# Patient Record
Sex: MALE | Race: WHITE
[De-identification: names, ages, dates, MRNs, and addresses within clinical notes are randomized per-mention and may not be internally consistent; named-entity substitution may affect disease eponyms.]

---

## 2018-01-24 ENCOUNTER — HOSPITAL ENCOUNTER (EMERGENCY)
Dept: HOSPITAL 25 - ED | Age: 28
Discharge: HOME | End: 2018-01-24
Payer: COMMERCIAL

## 2018-01-24 VITALS — SYSTOLIC BLOOD PRESSURE: 140 MMHG | DIASTOLIC BLOOD PRESSURE: 98 MMHG

## 2018-01-24 DIAGNOSIS — K04.7: Primary | ICD-10-CM

## 2018-01-24 DIAGNOSIS — F17.200: ICD-10-CM

## 2018-01-24 PROCEDURE — 99282 EMERGENCY DEPT VISIT SF MDM: CPT

## 2018-01-24 NOTE — ED
Throat Pain/Nasal Congestion





- HPI Summary


HPI Summary: 





27 male presents to ED with complaints of left bottom dental pain that began 2-

3 days ago and has been worsening since. Patient states he has been taking 

tylenol/ibuprofen without significant relief. Has also been applying temporary 

filler. Thinks his cavity caused trauma to tooth and is not possibly infected. 

Admits to some minimal swelling and swollen lymph nodes. States the pain 

radiates into left sinus and ear area. No fever/chills, nausea, vomiting, 

trouble swallowing or trouble breathing. No other complaints. No PMHx. No 

medications since 3pm when he took ibuprofen/tylenol. 





- History of Current Complaint


Chief Complaint: EDDentalPain


Time Seen by Provider: 01/24/18 01:20


Hx Obtained From: Patient


Onset/Duration: Sudden Onset, Lasting Days, Still Present


Severity: Moderate


Cough: Nonproductive





- Allergies/Home Medications


Allergies/Adverse Reactions: 


 Allergies











Allergy/AdvReac Type Severity Reaction Status Date / Time


 


No Known Allergies Allergy   Verified 01/24/18 00:37














PMH/Surg Hx/FS Hx/Imm Hx


Endocrine/Hematology History: 


   Denies: Hx Diabetes


Cardiovascular History: 


   Denies: Hx Hypertension


Respiratory History: 


   Denies: Hx Asthma





- Surgical History


Surgery Procedure, Year, and Place: n/a





- Immunization History


Immunizations Up to Date: Yes


Infectious Disease History: No


Infectious Disease History: 


   Denies: Traveled Outside the [place] in Last 30 Days





- Family History


Known Family History: Positive: None





- Social History


Alcohol Use: Occasionally


Substance Use Type: Reports: None


Smoking Status (MU): Heavy Every Day Tobacco Smoker





Review of Systems


Constitutional: Negative


Positive: Dental Pain, Ear Ache


Cardiovascular: Negative


Respiratory: Negative


Neurological: Negative


All Other Systems Reviewed And Are Negative: Yes





Physical Exam


Triage Information Reviewed: Yes


Vital Signs On Initial Exam: 


 Initial Vitals











Temp Pulse Resp BP Pulse Ox


 


 98 F   72   16   125/79   98 


 


 01/24/18 00:35  01/24/18 00:35  01/24/18 00:35  01/24/18 00:35  01/24/18 00:35











Vital Signs Reviewed: Yes


Appearance: Positive: Well-Appearing, No Pain Distress, Well-Nourished


Skin: Positive: Warm, Skin Color Reflects Adequate Perfusion, Dry.  Negative: 

Cold


Head/Face: Positive: Normal Head/Face Inspection


ENT: Positive: Normal ENT inspection, Hearing grossly normal, Pharynx normal, 

TMs normal, Uvula midline - airway patent.  Negative: TM bulging, TM dull, TM 

red, Tonsillar swelling, Tonsillar exudate, Trismus


Dental: Positive: Gross Decay/Caries @, Dental Fracture @ - left bottom tooth 

20 erythema and some external edema palapted, no abscess appreciated, Cervical 

Lymphadenopathy - left tonsillar


Neck: Positive: Supple, Nontender


Respiratory/Lung Sounds: Positive: Clear to Auscultation, Breath Sounds 

Present.  Negative: Rales, Rhonchi, Wheezes


Cardiovascular: Positive: Normal, RRR, Pulses are Symmetrical in both Upper and 

Lower Extremities.  Negative: Murmur, Rub


Neurological: Positive: Normal, Sensory/Motor Intact





Diagnostics





- Vital Signs


 Vital Signs











  Temp Pulse Resp BP Pulse Ox


 


 01/24/18 00:35  98 F  72  16  125/79  98














- Laboratory


Lab Statement: Any lab studies that have been ordered have been reviewed, and 

results considered in the medical decision making process.





EENT Course/Dx





- Course


Course Of Treatment: appears to be suffering from toothache/dental infection 

beginnings. fluids, salt water gargles and pain control. given norco and 

amoxicillin while in ED. continue at home with ibuprofen. good oral hygeine. 

follow up with dentist. topical agents also suggested. no other concerns at 

this time. aware of worsening signs and symptoms. pe normal, vitals normal. 

patient agrees and understands. all questions answered





- Differential Diagnoses


Differential Diagnoses: Dental Abscess, Dental Caries, Fractured Tooth





- Diagnoses


Provider Diagnoses: 


 Toothache, Dental infection








Discharge





- Discharge Plan


Condition: Stable


Disposition: HOME


Prescriptions: 


Amoxicillin PO (*) [Amoxicillin 500 MG CAP*] 500 mg PO Q12H #19 cap


HYDROcodone/ACETAMIN 5-325 MG* [Norco 5-325 TAB*] 1 tab PO Q6H PRN #8 tab MDD 2


 PRN Reason: Pain


Patient Education Materials:  Dental Abscess (ED), Toothache (ED)


Referrals: 


[facility] [place] PHYSICIAN REFERRAL [Outside]


Additional Instructions: 


continue ibuprofen for pain and inflammation.


pain medication as needed for breakthrough pain.


salt water gargles and good oral hygeine.


also recommend topical numbing agents and fillers as you are already using.


antibiotic to prevent/treat infection.


cool compresses for pain and swelling.


follow up with dentist.


any new or worsening symptoms please seek medical attention promptly.